# Patient Record
Sex: FEMALE | URBAN - METROPOLITAN AREA
[De-identification: names, ages, dates, MRNs, and addresses within clinical notes are randomized per-mention and may not be internally consistent; named-entity substitution may affect disease eponyms.]

---
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
## 2022-04-13 ENCOUNTER — APPOINTMENT (OUTPATIENT)
Dept: URBAN - METROPOLITAN AREA CLINIC 273 | Age: 22
Setting detail: DERMATOLOGY
End: 2022-04-13

## 2022-04-13 DIAGNOSIS — L91.0 HYPERTROPHIC SCAR: ICD-10-CM

## 2022-04-13 PROCEDURE — OTHER INJECTION: OTHER

## 2022-04-13 PROCEDURE — OTHER COUNSELING: OTHER

## 2022-04-13 PROCEDURE — 11900 INJECT SKIN LESIONS </W 7: CPT

## 2022-04-13 PROCEDURE — OTHER MIPS QUALITY: OTHER

## 2022-04-13 PROCEDURE — OTHER ADDITIONAL NOTES: OTHER

## 2022-04-13 ASSESSMENT — LOCATION SIMPLE DESCRIPTION DERM: LOCATION SIMPLE: RIGHT BACK

## 2022-04-13 ASSESSMENT — LOCATION DETAILED DESCRIPTION DERM: LOCATION DETAILED: RIGHT SUPERIOR LATERAL UPPER BACK

## 2022-04-13 ASSESSMENT — LOCATION ZONE DERM: LOCATION ZONE: TRUNK

## 2022-04-13 NOTE — PROCEDURE: INJECTION
Medication (1) And Associated J-Code Units: Triamcinolone acetonide, 10mg
Bill J-Code: yes
Dose Administered (Numbers Only): 0
Hide Second Medication?: No
Post-Care Instructions: I reviewed with the patient in detail post-care instructions. Patient understands to keep the injection sites clean and call the clinic if there is any redness, swelling or pain.
Units: mg
Consent: The risks of the medication were reviewed with the patient.
Route: IL
Total Volume Injected In Cc (Will Not Affected Billing): 0.5
Procedure Information: Please note that the numeric value listed in the Medication (1) and associated J-code units and Medication (2) and associated J-code units variables are j-code amounts and do not represent either the concentration or the total amount of the medications injected.  I strongly recommend selecting no to the Render J-code information in note question. This will allow your note to be more clear. If you are billing j-codes with your injection codes you need to document the total amount of the medication injected. This amount should match the j-code units. For example, if you are injecting Triamcinolone 40mg as an intramuscular injection you would select 40 for the dose field and mg for the units. This would allow you to document  with 4 units (40mg = 10mg x 4). The total volume is not used to calculate j-codes only the amount of the medication administered.
Administered By (Optional): Willa
Dose Administered (Numbers Only): 5
Detail Level: None

## 2022-04-13 NOTE — PROCEDURE: ADDITIONAL NOTES
Additional Notes: Discussed to options with patient. She has had lesion injected in past. Discussed removal, and how lesion can come back & form another keloid scar. Pt opted not to see DrKitty & just get area injected.
Detail Level: Simple
Render Risk Assessment In Note?: no